# Patient Record
Sex: FEMALE | Race: WHITE | NOT HISPANIC OR LATINO | Employment: OTHER | URBAN - METROPOLITAN AREA
[De-identification: names, ages, dates, MRNs, and addresses within clinical notes are randomized per-mention and may not be internally consistent; named-entity substitution may affect disease eponyms.]

---

## 2024-06-21 ENCOUNTER — TRANSFERRED RECORDS (OUTPATIENT)
Dept: HEALTH INFORMATION MANAGEMENT | Facility: CLINIC | Age: 79
End: 2024-06-21
Payer: COMMERCIAL

## 2024-06-25 ENCOUNTER — TRANSCRIBE ORDERS (OUTPATIENT)
Dept: OTHER | Age: 79
End: 2024-06-25

## 2024-06-25 DIAGNOSIS — C54.1 PRIMARY CLEAR CELL ADENOCARCINOMA OF ENDOMETRIUM (H): Primary | ICD-10-CM

## 2024-07-02 ENCOUNTER — TELEPHONE (OUTPATIENT)
Dept: RADIATION ONCOLOGY | Facility: CLINIC | Age: 79
End: 2024-07-02
Payer: COMMERCIAL

## 2024-07-02 NOTE — TELEPHONE ENCOUNTER
RN called pt and reviewed up coming appointments. [facility] and gave pt number to call and set up a My Chart.  All questions were answered at this time. Clinic phone number given to pt as well.

## 2024-07-05 ENCOUNTER — TELEPHONE (OUTPATIENT)
Dept: RADIATION ONCOLOGY | Facility: CLINIC | Age: 79
End: 2024-07-05
Payer: COMMERCIAL

## 2024-07-05 NOTE — TELEPHONE ENCOUNTER
Call from different team member in [place] saying they spoke with Pt this am and she needed contact with the [facility].     Called patient and left VM with nursing desk number so we could help connect pt to [facility].

## 2024-08-04 NOTE — PROGRESS NOTES
ATTENDING NOTE:    DIAGNOSIS: Clear cell carcinoma of the endometrium, FIGO IA (), FIGO IC ()    HPI: Ms. Macdonald is a 80 yo female with a newly diagnosed early stage endometrial cancer. This was incidentally found on CT scan ordered for evaluation of recurrent UTI. On the imaging, her uterus was noted to be heterogeneous with several masses and distended cavity. Hysteroscopy and D&C on  showed polypoid tissue and calcification in the left fundal superior sidewall. 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 TP53 wildtype. She then established care with Dr. Bravo and proceeded with TLH-BSO-SLN sampling on 2024. Pelvic washing was negative for malignant cells. 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 However, adenomyosis was present and involved by carcinoma. LVSI was not identified. A total of 2 SLN were removed, with ITC identified in a right pelvic node. Final pathologic stage pT1aN0(I+), FIGO IA (), FIGO IC ().    She saw Dr. Francisco in heme/onc on 2024 and was recommended 3 cycles of Carbo/Taxol. She met with Dr. Marcos on 2024,who referred her to us for vaginal brachytherapy.     On interview, she states that she has recovered well from the surgery. She denies vaginal bleeding or discharge. She tolerated the first cycle of Carbo/Taxol well without noticing any side effects, though her counts are a bit low necessitating delay vs. dose reduction in her next cycle of chemo. She has a history of recurrent UTI and is on prophylactic antibiotics.       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     ,  x 2,   x 1    PAST RADIATION HISTORY:  3000 cGy in 15 fractions to the right neck by Dr. Alvarado in [place]       EXAM: bimanual exam showed well smooth vaginal canal and cuff with no palpable nodularity.       ASSESSMENT/PLAN: Ms. Macdonald is a 80 yo female with a clear cell carcinoma of the endometrium, s/p surgical staging. She is currently receiving adjuvant chemotherapy with Carbo/Taxol. We again reviewed her pathologic findings and explained the rationale of adjuvant brachytherapy, which is to reduce the risk of cuff recurrence. She does have ITC identified in a SLN. We explained that ITC by itself does not influence treatment decision. She has no additional intrauterine risk factors to warrant pelvic massimo irradiation.     We then discussed the logistics and the side effects of the vaginal cuff brachytherapy in detail. We recommend 3000 cGy given over 5 fractions every other day. She is scheduled to proceed her first treatment today. She will be staying at [facility] for the duration of the treatment.       Thanks for allowing us to participate in the care of this ryne lady.    Vidal Hampton MD  [phone]  Radiation Oncology        I reviewed patient's chart, internal/external medical records, imaging studies (including actual images), labs and pathology reports.  I interviewed and counseled the patient face to face.  I additionally ordered tests and discussed the case with patient's referring physicians and care team.          Vidal Hampton MD

## 2024-08-06 NOTE — PROGRESS NOTES
Department of Radiation Oncology                   [place] [address]  [address] [place]  [address]  Office:  [phone]  Fax:  [phone]   Radiation Oncology Clinic  [address] [place] [address]  Phone:  [phone]  Fax:  [phone]     RE: Sophy Macdonald : 1945   MRN: [id number] DIANDRA: 2024     OUTPATIENT VISIT NOTE       PROBLEM: Clear cell carcinoma of the endometrium, FIGO IA (), FIGO IC ()       was seen for initial consultation in the Dept of Radiation Oncology on 2024 at the request of Dr. Marcos    HISTORY OF PRESENT ILLNESS:     Ms. Macdonald is a 78 yo female with a newly diagnosed early stage endometrial cancer.     4/10/2024 - Patient had CT AP for recurrent UTI with concern for bladder or kidney stones. No calculi were identified. However the uterus was noted to be heterogenous with several uterine masses. There was also a density enlarging collection along the ventral uterus thought to represent degeneration of a fibroid vs fluid within the endometrial canal. Pelvic ultrasound recommended.    2024 - Uterus noted to be enlarged to 9.6 x 6.8 x 7.7 cm on pelvic ultrasound. Endometrium fluid-filled suggestive of benign debris extending to the internal cervical os, suggesting cervical stenosis as well as polyps and fibroids.    2024 - Patient presented to OB/GYN. Underwent endometrial biopsy which was unremarkable. Sampling was insufficient, recommendation to proceed with hysteroscopy and D&C.    5/10/2024 - Patient underwent hysteroscopy and D&C. Upon hysteroscopy, there was an area of polypoid tissue and calcification in the left fundal superior sidewall. Final pathology demonstrated high-grade endometrial carcinoma, favoring clear-cell type. P16 staining is also patchy positive, while progesterone receptor staining is negative.  P53 staining is wild-type.    2024 - Patient sees Dr. Bravo of Gyn Onc who recommended  robotic staging procedure.    6/4/2024 - Patient undergoes TLH with BSO and SLN sampling with removal of 2 SLN. Pathology again demonstrated clear cell adenocarcinoma, with no evidence of myometrial invasion. Adenomyosis was present, involved by carcinoma. No uterine serosal involvement, lower uterine segment involvement, or cervical stromal involvement identified. Peritoneal fluid was negative for malignant cells. LVSI not identified. All margines were negative for invasive carcinoma. Isolated tumor cells noted in right pelvic lymph node. Final staging was pT1a p N0(i+), FIGO stage IC (2023)     6/14/2024 - Patient follows with Dr. Jay Francisco of Heme/Onc for her history of diffuse large cell lymphoma and follicular lymphoma. She met with Dr. Francisco who recommended short course of adjuvant chemotherapy with 3 cycles of carboplatin and paclitaxel.    6/21/2024 - Patient met with Dr. Marcos of Radiation Oncology, who recommended vaginal cuff brachytherapy. As they do not offer this service in [place], she was referred to Dr. Hampton. At this point, patient was also recently diagnosed with her 7th UTI within the last year, and was taking antibiotics.     8/7/2024 - Patient presents to clinic today accompanied by her daughter Carmencita to discuss the role of radiation in her care. Today she is doing well, and recovering appropriately from surgery. She is not experiencing vaginal bleeding or discharge. She has had once cycle of chemotherapy, but the second was postponed due to low blood counts. Otherwise she is not symptomatic from chemotherapy. She is currently taking a prophylactic antibiotic, methenamine hippurate, for her recurrent urinary symptoms. She forgot the medication at home and requests our office to fill the prescription for a few doses.       PAST MEDICAL HISTORY:    Past Medical History:   Diagnosis Date    Anxiety     Chronic kidney disease, stage III (moderate) (H)     Depression     Endometrial  cancer (H)     clear-cell carcinoma of the uterus    Essential tremor     History of diffuse large B-cell lymphoma     treated with chemotherapy and adjuvant radiotherapy    History of follicular lymphoma     treated with rituximab    HTN (hypertension)     Hyperlipidemia     Osteoarthritis     Osteoporosis     Waldenstrom's disease (H)       CHEMOTHERAPY HISTORY:    4/19/2019 - completed 6 cycles of R-EPOCH    PAST RADIATION THERAPY HISTORY:   July 31, 2020, Ms. Macdonald completed 30 Gray in 15 fractions directed to the right neck under the care of of Dr. Jayson Alvarado in [place]      MEDICATIONS:   Current Outpatient Medications   Medication Sig Dispense Refill    acetaminophen (TYLENOL) 500 MG tablet Take 1,000 mg by mouth      ascorbic acid 1000 MG TABS tablet Take 2,000 mg by mouth      Biotin 10 MG TABS tablet Take 1 tablet by mouth daily      budesonide (PULMICORT FLEXHALER) 180 MCG/ACT inhaler Inhale 1 puff into the lungs      docusate sodium (DSS) 100 MG capsule Take 1 capsule by mouth 2 times daily      famotidine (PEPCID) 20 MG tablet Take 1 tablet by mouth 2 times daily      furosemide (LASIX) 20 MG tablet Take 1 tablet by mouth daily      levocetirizine (XYZAL) 5 MG tablet Take 1 tablet by mouth daily      methenamine hippurate (HIPREX) 1 g tablet Take 1 tablet by mouth 2 times daily (with meals)      metroNIDAZOLE (METROGEL) 1 % external gel Apply 0.5 g topically      ondansetron (ZOFRAN ODT) 4 MG ODT tab Take 1 tablet by mouth every 8 hours as needed      prochlorperazine (COMPAZINE) 10 MG tablet Take 10 mg by mouth      propranolol ER (INDERAL LA) 80 MG 24 hr capsule Take 80 mg by mouth      sertraline (ZOLOFT) 25 MG tablet Take 1 tablet by mouth daily      traZODone (DESYREL) 50 MG tablet Take 25-50 mg by mouth         ALLERGIES: is allergic to doxycycline, amoxicillin, azithromycin, and morphine.    SOCIAL HISTORY:      Ms. Macdonald is single and has 3 grown children. One of her daughters lives  in [place], close to the [facility]. She will be staying at the [facility] during her treatment. She is a non-smoker and does not drink alcohol.   Social History     Socioeconomic History    Marital status: Single     Spouse name: Not on file    Number of children: 3    Years of education: Not on file    Highest education level: Not on file   Occupational History    Not on file   Tobacco Use    Smoking status: Never    Smokeless tobacco: Never   Vaping Use    Vaping status: Former   Substance and Sexual Activity    Alcohol use: Not Currently    Drug use: Never    Sexual activity: Not on file   Other Topics Concern    Not on file   Social History Narrative    Not on file     Social Determinants of Health     Financial Resource Strain: Low Risk  (5/10/2024)    [facility]    Overall Financial Resource Strain (CARDIA)     Difficulty of Paying Living Expenses: Not hard at all   Food Insecurity: No Food Insecurity (6/4/2024)    [facility]    Hunger Vital Sign     Worried About Running Out of Food in the Last Year: Never true     Ran Out of Food in the Last Year: Never true   Transportation Needs: No Transportation Needs (6/4/2024)    [facility]    PRAPARE - Transportation     Lack of Transportation (Medical): No     Lack of Transportation (Non-Medical): No   Physical Activity: Inactive (5/10/2024)    [facility]    Exercise Vital Sign     Days of Exercise per Week: 0 days     Minutes of Exercise per Session: 0 min   Stress: Patient Declined (5/10/2024)    [facility]    [place] [place] [employer] - Occupational Stress Questionnaire     Feeling of Stress : Patient declined   Social Connections: Patient Declined (5/10/2024)    Received from  [facility]    Social Connection and Isolation Panel [NHANES]     Frequency of Communication with Friends and Family: Patient declined     Frequency of Social Gatherings with Friends and Family: Patient declined     Attends Yazidism Services: Patient declined     Active Member of Clubs or Organizations: Patient declined     Attends Club or Organization Meetings: Patient declined     Marital Status: Patient declined   Interpersonal Safety: Not At Risk (6/4/2024)    Received from [facility]     Do you feel UNSAFE in any of your personal relationships with your family members or any other acquaintances?: No   Housing Stability: Low Risk  (6/4/2024)    Received from [facility]    Housing Stability Vital Sign     Unable to Pay for Housing in the Last Year: No     Number of Times Moved in the Last Year: 0     Homeless in the Last Year: No        FAMILY HISTORY:   family history includes Hodgkin's lymphoma in her sister; Pancreatic Cancer in her mother.  Sister with Hodgkin's lymphoma who unfortunately passed at the age of 30   Maternal uncle with lymphoma, unsure of type, diagnosed in 60s    REVIEW OF SYMPTOMS:  A full 14-point review of systems was performed. See HPI for details.     PHYSICAL EXAMINATION:    /41   Pulse 61   SpO2 95%   There is no height or weight on file to calculate BMI.   Gen: alert and oriented, no acute distress  HEENT: unremarkable   CV: well perfused  Resp: breathing comfortably on room air  Neuro: grossly intact  Pelvic: deferred    PATHOLOGY:  5/14/2024 - Endometrial curettings  Endometrium, curettage:  -High-grade endometrial carcinoma, favor clear-cell type.  Tumor morphology suggests clear-cell carcinoma. However, sampling is relatively scant and definitive subtyping will be performed on the resection specimen.   Sections from the endometrial curettage  contain fragments of endometrium involved by a malignant glandular neoplasm.  The neoplasm has a varied glandular, microcystic and papillary architecture.  Many of the malignant cells exhibit water clear cytoplasm, with distinct cytoplasmic borders.  The tumor nuclei are round to oval and vesicular, with most containing 1 or 2 prominent eosinophilic nucleoli.  The tumor is infiltrating a desmoplastic stroma, with areas of neutrophilic inflammatory cells.  A battery of appropriately controlled immunohistochemical stains is applied to the neoplasm and shows it staining positive for vimentin, PAX8, Napsin (patchy), racemase (focal) and estrogen receptor (patchy).  P16 staining is also patchy positive, while progesterone receptor staining is negative.  P53 staining is wild-type.     Cytochemical, immunohistochemical, and/or in-situ hybridization stains are performed with either appropriate internal or external controls.    5/1/2024 - Endometrial biopsy:  - Strips of cytologically benign endometrium with benign tubal, eosinophilic, and secretory metaplasia.  - Rare inactive fragments of functionalis with stromal breakdown, negative for endometritis, hyperplasia, and malignancy.    6/4/2024 TLH, BSO, SLN sampling  A.  Omentum, omentectomy:  No evidence of malignancy     B.  [place] lymph node, right pelvic, excision:  One (1) lymph node with a single minute focus of isolated tumor cells (see comment)     C.  [place] lymph nodes, left pelvic, excision:  One (1) lymph node, negative for metastatic carcinoma     D.  Uterus with fallopian tubes and ovaries, hysterectomy and bilateral salpingo-oophorectomy:  Minute foci of residual endometrial carcinoma, involving adenomyosis (see synoptic report and comment)  Atrophic endometrium with features consistent with recent curettage procedure  Benign cervical polyp  Leiomyomata (including 1 lipoleiomyoma)    TUMOR   Histologic Type Clear cell adenocarcinoma, NOS   Myometrial  Invasion Not identified   Adenomyosis Present, involved by carcinoma   Uterine Serosa Involvement Not identified   Lower Uterine Segment Involvement Not identified   Cervical Stromal Involvement Not identified   Other Tissue / Organ Involvement Not identified   Peritoneal / Ascitic Fluid Negative for malignant cells   Lymphatic and / or Vascular Invasion Not identified   MARGINS   Margin Status All margins negative for invasive carcinoma   REGIONAL LYMPH NODES   Regional Lymph Node Status Tumor present in pelvic lymph node(s)     Total Number of Pelvic Nodes with Macrometastasis 0   Number of Pelvic [place] Nodes with Macrometastasis 0   Total Number of Pelvic Nodes with Micrometastasis 0   Number of Pelvic [place] Nodes with Micrometastasis 0   Total Number of Pelvic Nodes with Isolated Tumor Cells 1   Number of Pelvic [place] Nodes with ITCs 1   Laterality of Pelvic Node(s) with Tumor Right sentinel   Lymph Nodes Examined   Total Number of Pelvic Nodes Examined 2   Number of Pelvic [place] Nodes Examined 2   Total Number of Para-aortic Nodes Examined 0   pTNM CLASSIFICATION (AJCC 8th Edition)   Reporting of pT, pN, and (when applicable) pM categories is based on information available to the pathologist at the time the report is issued. As per the AJCC (Chapter 1, 8th Ed.) it is the managing physician s responsibility to establish the final pathologic stage based upon all pertinent information, including but potentially not limited to this pathology report.   pT Category pT1a   pN Category pN0(i+)   N Suffix (sn)   FIGO STAGE   FIGO Stage IC   ADDITIONAL FINDINGS   Additional Findings Leiomyomata, benign cervical polyp   Comments   The prior curettage specimen ([id number]) was reviewed. The high-grade, clear cell component seen in the curettage specimen is not identified in this resection specimen. Focal lower grade carcinoma is present, and appears to involve adenomyosis. There is no definitive evidence of  myometrial invasion. It is possible that the high-grade carcinoma seen in the previous curettage specimen was present within an endometrial polyp that was removed by the curettage procedure.       IMAGIN/10/2024 - CT AP  IMPRESSION:   1. Possible submillimeter calculus in the left renal calyx. No findings of urinary obstruction.   2.  Hydrometra raising the possibility for cervical stenosis. Differential diagnosis could include degenerating uterine fibroid. This could be further assessed with pelvic ultrasound.   3.  Cholelithiasis and esophageal hiatus hernia are incidental.       2024 - Transvaginal Ultrasound  FINDINGS:   The uterus is enlarged in size measuring 9.6 cm x 6.8 cm x 7.7 cm. The endometrial lining is difficult to assess secondary to hydrometra. Fluid-filled endometrium with fluid/fluid level, suggesting benign debris, measuring up to 95 mL/7.2 x 6.4 x 4.0 cm. Fluid extends to the cervical internal os, without passage. Otherwise the cervix is unremarkable. There are 2 subserosal fibroids visualized, measuring 5.6 x 5.4 x 4.2 cm within the right and mid uterus and 2.4 x 2.2 x 1.7 cm within the left mid uterus. There are 2 polyps which measure approximately 1.5 and 0.9 cm in greatest dimension near the uterine fundus.   Ovaries were not well visualized.   IMPRESSION:   1. Fluid-debris level hydrometra. This extends to the internal os of the cervix, suggesting cervical stenosis.   2. There are a couple of uterine endometrial polyps near the fundus.   3. Fibroid uterus.       ASSESSMENT AND PLAN: In summary, Sophy Macdonald is a 79 year old woman with newly diagnosed clear cell carcinoma of the endometrium, FIGO IA (), FIGO IC ().    The indications, risk, benefits, and side effects of radiation were discussed with the patient in great detail. Overall, radiation therapy would be recommended in this setting in order to improve local control. We reviewed with the patient what a typical course  of radiation would entail. The process of a CT simulation and treatment planning was explained. Images acquired during this session would be used for radiation treatment planning purposes.    The patient was made aware that treatment extends over a several-week time period and is only delivered on weekdays. The areas that would be targeted would include the vaginal cuff. We will plan for 30 Gy in 5 fractions, with treatment occurring every other day, starting today. The acute side effects of therapies and potential late toxicities were reviewed in detail. The patient was made aware that during the course of radiation, acute side effects can include, but are not limited to fatigue, vaginal discharge, dryness, itching, bleeding, urinary/bladder irritation, and diarrhea. The supportive therapies available for this were reviewed. The typical timeline for the resolution of these effects was given. Potential long term side effects can include, but are not limited to fibrosis, telangiectasias, stenosis, shortening or narrowing of the vagina, and dyspareunia, and a small <1% risk of secondary malignancies.    After the discussion, the patient was given the opportunity to ask questions and they were answered to her apparent satisfaction. The patient was encouraged to contact me regarding any additional questions in the interim and my phone number was provided.    Thank you for allowing us to participate in this patient's care.  Please feel free to call with any questions or concerns.       Polina Munroe, MS4  Radiation Oncology

## 2024-08-07 ENCOUNTER — OFFICE VISIT (OUTPATIENT)
Dept: RADIATION ONCOLOGY | Facility: CLINIC | Age: 79
End: 2024-08-07
Attending: RADIOLOGY
Payer: MEDICARE

## 2024-08-07 ENCOUNTER — PRE VISIT (OUTPATIENT)
Dept: RADIATION ONCOLOGY | Facility: CLINIC | Age: 79
End: 2024-08-07
Payer: COMMERCIAL

## 2024-08-07 VITALS — SYSTOLIC BLOOD PRESSURE: 131 MMHG | DIASTOLIC BLOOD PRESSURE: 41 MMHG | HEART RATE: 61 BPM | OXYGEN SATURATION: 95 %

## 2024-08-07 DIAGNOSIS — N39.0 CHRONIC UTI: Primary | ICD-10-CM

## 2024-08-07 DIAGNOSIS — C54.1 ENDOMETRIAL CANCER (H): Primary | ICD-10-CM

## 2024-08-07 PROCEDURE — 77332 RADIATION TREATMENT AID(S): CPT | Mod: 26 | Performed by: RADIOLOGY

## 2024-08-07 PROCEDURE — 271N000005 HC IMPLANT RADIATION BRIEF: Performed by: RADIOLOGY

## 2024-08-07 PROCEDURE — 77470 SPECIAL RADIATION TREATMENT: CPT | Performed by: RADIOLOGY

## 2024-08-07 PROCEDURE — 99204 OFFICE O/P NEW MOD 45 MIN: CPT | Mod: 25 | Performed by: RADIOLOGY

## 2024-08-07 PROCEDURE — 57156 INS VAG BRACHYTX DEVICE: CPT | Performed by: RADIOLOGY

## 2024-08-07 PROCEDURE — C1717 BRACHYTX, NON-STR,HDR IR-192: HCPCS | Performed by: RADIOLOGY

## 2024-08-07 PROCEDURE — 77470 SPECIAL RADIATION TREATMENT: CPT | Mod: 26 | Performed by: RADIOLOGY

## 2024-08-07 PROCEDURE — 77332 RADIATION TREATMENT AID(S): CPT | Performed by: RADIOLOGY

## 2024-08-07 PROCEDURE — 77263 THER RADIOLOGY TX PLNG CPLX: CPT | Performed by: RADIOLOGY

## 2024-08-07 PROCEDURE — 77290 THER RAD SIMULAJ FIELD CPLX: CPT | Mod: 26 | Performed by: RADIOLOGY

## 2024-08-07 PROCEDURE — 77295 3-D RADIOTHERAPY PLAN: CPT | Mod: 26 | Performed by: RADIOLOGY

## 2024-08-07 PROCEDURE — 77295 3-D RADIOTHERAPY PLAN: CPT | Performed by: RADIOLOGY

## 2024-08-07 PROCEDURE — G0463 HOSPITAL OUTPT CLINIC VISIT: HCPCS | Mod: 25 | Performed by: RADIOLOGY

## 2024-08-07 RX ORDER — METHENAMINE HIPPURATE 1000 MG/1
1 TABLET ORAL 2 TIMES DAILY WITH MEALS
COMMUNITY
Start: 2024-06-25

## 2024-08-07 RX ORDER — PROCHLORPERAZINE MALEATE 10 MG
10 TABLET ORAL
COMMUNITY
Start: 2024-07-05

## 2024-08-07 RX ORDER — FUROSEMIDE 20 MG
1 TABLET ORAL DAILY
COMMUNITY
Start: 2024-03-31

## 2024-08-07 RX ORDER — DOCUSATE SODIUM 100 MG/1
1 CAPSULE, LIQUID FILLED ORAL 2 TIMES DAILY
COMMUNITY
Start: 2024-06-04

## 2024-08-07 RX ORDER — FAMOTIDINE 20 MG/1
1 TABLET, FILM COATED ORAL 2 TIMES DAILY
COMMUNITY
Start: 2023-01-10

## 2024-08-07 RX ORDER — TRAZODONE HYDROCHLORIDE 50 MG/1
25-50 TABLET, FILM COATED ORAL
COMMUNITY
Start: 2024-02-12

## 2024-08-07 RX ORDER — METHENAMINE HIPPURATE 1000 MG/1
1 TABLET ORAL 2 TIMES DAILY
Qty: 6 TABLET | Refills: 0 | Status: SHIPPED | OUTPATIENT
Start: 2024-08-07

## 2024-08-07 RX ORDER — METRONIDAZOLE 10 MG/G
0.5 GEL TOPICAL
COMMUNITY
Start: 2023-04-26

## 2024-08-07 RX ORDER — BUDESONIDE 180 UG/1
1 AEROSOL, POWDER RESPIRATORY (INHALATION)
COMMUNITY
Start: 2024-07-31 | End: 2025-07-31

## 2024-08-07 RX ORDER — SERTRALINE HYDROCHLORIDE 25 MG/1
1 TABLET, FILM COATED ORAL DAILY
COMMUNITY
Start: 2023-12-31

## 2024-08-07 RX ORDER — LEVOCETIRIZINE DIHYDROCHLORIDE 5 MG/1
1 TABLET, FILM COATED ORAL DAILY
COMMUNITY
Start: 2023-12-31

## 2024-08-07 RX ORDER — ACETAMINOPHEN 500 MG
1000 TABLET ORAL
COMMUNITY
Start: 2024-06-04

## 2024-08-07 RX ORDER — BIOTIN 10 MG
1 TABLET ORAL DAILY
COMMUNITY
Start: 2023-09-05

## 2024-08-07 RX ORDER — PROPRANOLOL HYDROCHLORIDE 80 MG/1
80 CAPSULE, EXTENDED RELEASE ORAL
COMMUNITY
Start: 2024-03-31

## 2024-08-07 RX ORDER — ONDANSETRON 4 MG/1
1 TABLET, ORALLY DISINTEGRATING ORAL EVERY 8 HOURS PRN
COMMUNITY
Start: 2022-11-16

## 2024-08-07 NOTE — PROGRESS NOTES
Oncology Rooming Note    August 7, 2024 9:34 AM   Sopyh Macdonald is a 79 year old female who presents for:    Chief Complaint   Patient presents with    Cancer     Radiation consult     Initial Vitals: /41   Pulse 61   SpO2 95%  There is no height or weight on file to calculate BMI. There is no height or weight on file to calculate BSA.  No Pain (0) Comment: Data Unavailable   No LMP recorded.  Allergies reviewed: Yes  Medications reviewed: Yes    Medications: MEDICATION REFILLS NEEDED TODAY. Provider was notified.  Pharmacy name entered into EPIC: Pt needs Hiprex 1 GM 2 times daily with meals. Pt forgot her pills at home and needs them while staying at [facility]    Frailty Screening:   Is the patient here for a new oncology consult visit in cancer care? 2. No      Clinical concerns:   Here for consultation  was notified.    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 Jayson Alvarado here in [place]    Prior hormonal therapy: Vaginal cream short time just before her hysterectomy.    Pain Eval:  Denies    Psychosocial  Living arrangements: Self  Fall Risk: ambulates with assistive device, walker   referral needs: Not needed    Advanced Directive: Yes - Location: At home  Implantable Cardiac Device? No    Onset of menarche: @ age 12  LMP: No LMP recorded.  Onset of menopause: @ age 50  Abnormal vaginal bleeding/discharge: No  Are you pregnant? No  Reproductive note: 3 children    Nurse face-to-face time: Level 4:  15 min face to face timeConsiderations for radiation treatment   Pregnancy status: Female with hysterectomy   Implanted Cardiac Devices: No   Any previous radiation therapy: Yes

## 2024-08-07 NOTE — LETTER
8/7/2024      Sophy Macdonald  [address]  [facility] [address]      Dear Colleague,    Thank you for referring your patient, Sophy Macdonald, to the [facility] RADIATION ONCOLOGY. Please see a copy of my visit note below.    ATTENDING NOTE:    DIAGNOSIS: Clear cell carcinoma of the endometrium, FIGO IA (2009), FIGO IC (2023)    HPI: Ms. Macdonald is a 80 yo female with a newly diagnosed early stage endometrial cancer. This was incidentally found on CT scan ordered for evaluation of recurrent UTI. On the imaging, her uterus was noted to be heterogeneous with several masses and distended cavity. Hysteroscopy and D&C on 5/14 showed polypoid tissue and calcification in the left fundal superior sidewall. 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 TP53 wildtype. She then established care with Dr. Bravo and proceeded with TLH-BSO-SLN sampling on 6/4/2024. Pelvic washing was negative for malignant cells. 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 However, adenomyosis was present and involved by carcinoma. LVSI was not identified. A total of 2 SLN were removed, with ITC identified in a right pelvic node. Final pathologic stage pT1aN0(I+), FIGO IA (2009), FIGO IC (2023).    She saw Dr. Francisco in heme/onc on 6/14/2024 and was recommended 3 cycles of Carbo/Taxol. She met with Dr. Marcos on 6/21/2024,who referred her to us for vaginal brachytherapy.     On interview, she states that she has recovered well from the surgery. She denies vaginal bleeding or discharge. She tolerated the first cycle of Carbo/Taxol well without noticing any side effects, though her counts are a bit low necessitating delay vs. dose reduction in her next cycle of chemo. She has a history of recurrent UTI and is on prophylactic antibiotics.       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     ,  x 2,  x 1    PAST RADIATION HISTORY:  3000 cGy in 15 fractions to the right neck by Dr. Alvarado in [place]       EXAM: bimanual exam showed well smooth vaginal canal and cuff with no palpable nodularity.       ASSESSMENT/PLAN: Ms. Macdonald is a 80 yo female with a clear cell carcinoma of the endometrium, s/p surgical staging. She is currently receiving adjuvant chemotherapy with Carbo/Taxol. We again reviewed her pathologic findings and explained the rationale of adjuvant brachytherapy, which is to reduce the risk of cuff recurrence. She does have ITC identified in a SLN. We explained that ITC by itself does not influence treatment decision. She has no additional intrauterine risk factors to warrant pelvic massimo irradiation.     We then discussed the logistics and the side effects of the vaginal cuff brachytherapy in detail. We recommend 3000 cGy given over 5 fractions every other day. She is scheduled to proceed her first treatment today. She will be staying at [facility] for the duration of the treatment.       Thanks for allowing us to participate in the care of this ryne lady.    Vidal Hampton MD  [phone]  Radiation Oncology        I reviewed patient's chart, internal/external medical records, imaging studies (including actual images), labs and pathology reports.  I interviewed and counseled the patient face to face.  I additionally ordered tests and discussed the case with patient's referring physicians and care team.          Vidal Hampton MD              Department of Radiation Oncology                   [place] [address]  [address] [place] [address]  [id number]  Office:  [phone]  Fax:  [phone]   Radiation Oncology Clinic  [facility] [place] [address]  Phone:  [phone]  Fax:  [phone]     RE: Sophy Macdonald : 1945   MRN: [id number] DIANDRA: 2024     OUTPATIENT VISIT NOTE       PROBLEM: Clear cell carcinoma of the endometrium, FIGO IA (), FIGO IC ()       was seen for initial consultation in the Dept of Radiation Oncology on 2024 at the request of Dr. Marcos    HISTORY OF PRESENT ILLNESS:     Ms. Macdonald is a 80 yo female with a newly diagnosed early stage endometrial cancer.     4/10/2024 - Patient had CT AP for recurrent UTI with concern for bladder or kidney stones. No calculi were identified. However the uterus was noted to be heterogenous with several uterine masses. There was also a density enlarging collection along the ventral uterus thought to represent degeneration of a fibroid vs fluid within the endometrial canal. Pelvic ultrasound recommended.    2024 - Uterus noted to be enlarged to 9.6 x 6.8 x 7.7 cm on pelvic ultrasound. Endometrium fluid-filled suggestive of benign debris extending to the internal cervical os, suggesting cervical stenosis as well as polyps and fibroids.    2024 - Patient presented to OB/GYN. Underwent endometrial biopsy which was unremarkable. Sampling was insufficient, recommendation to proceed with hysteroscopy and D&C.    5/10/2024 - Patient underwent hysteroscopy and D&C. Upon hysteroscopy, there was an area of polypoid tissue and calcification in the left fundal superior sidewall. Final pathology demonstrated high-grade endometrial carcinoma, favoring clear-cell type. P16 staining is also patchy positive, while progesterone receptor staining is negative.  P53 staining is wild-type.    2024 - Patient sees Dr. Bravo of Gyn Onc who recommended robotic staging procedure.    2024 - Patient undergoes TLH with BSO and SLN sampling with removal of 2 SLN. Pathology again demonstrated clear cell adenocarcinoma, with no evidence of myometrial  invasion. Adenomyosis was present, involved by carcinoma. No uterine serosal involvement, lower uterine segment involvement, or cervical stromal involvement identified. Peritoneal fluid was negative for malignant cells. LVSI not identified. All margines were negative for invasive carcinoma. Isolated tumor cells noted in right pelvic lymph node. Final staging was pT1a p N0(i+), FIGO stage IC (2023)     6/14/2024 - Patient follows with Dr. Jay Francisco of Heme/Onc for her history of diffuse large cell lymphoma and follicular lymphoma. She met with Dr. Francisco who recommended short course of adjuvant chemotherapy with 3 cycles of carboplatin and paclitaxel.    6/21/2024 - Patient met with Dr. Marcos of Radiation Oncology, who recommended vaginal cuff brachytherapy. As they do not offer this service in [place], she was referred to Dr. Hampton. At this point, patient was also recently diagnosed with her 7th UTI within the last year, and was taking antibiotics.     8/7/2024 - Patient presents to clinic today accompanied by her daughter Carmencita to discuss the role of radiation in her care. Today she is doing well, and recovering appropriately from surgery. She is not experiencing vaginal bleeding or discharge. She has had once cycle of chemotherapy, but the second was postponed due to low blood counts. Otherwise she is not symptomatic from chemotherapy. She is currently taking a prophylactic antibiotic, methenamine hippurate, for her recurrent urinary symptoms. She forgot the medication at home and requests our office to fill the prescription for a few doses.       PAST MEDICAL HISTORY:    Past Medical History:   Diagnosis Date     Anxiety      Chronic kidney disease, stage III (moderate) (H)      Depression      Endometrial cancer (H)     clear-cell carcinoma of the uterus     Essential tremor      History of diffuse large B-cell lymphoma     treated with chemotherapy and adjuvant radiotherapy     History of follicular  lymphoma     treated with rituximab     HTN (hypertension)      Hyperlipidemia      Osteoarthritis      Osteoporosis      Waldenstrom's disease (H)       CHEMOTHERAPY HISTORY:    4/19/2019 - completed 6 cycles of R-EPOCH    PAST RADIATION THERAPY HISTORY:   July 31, 2020, Ms. Macdonald completed 30 Gray in 15 fractions directed to the right neck under the care of of Dr. Jayson Alvarado in [place]      MEDICATIONS:   Current Outpatient Medications   Medication Sig Dispense Refill     acetaminophen (TYLENOL) 500 MG tablet Take 1,000 mg by mouth       ascorbic acid 1000 MG TABS tablet Take 2,000 mg by mouth       Biotin 10 MG TABS tablet Take 1 tablet by mouth daily       budesonide (PULMICORT FLEXHALER) 180 MCG/ACT inhaler Inhale 1 puff into the lungs       docusate sodium (DSS) 100 MG capsule Take 1 capsule by mouth 2 times daily       famotidine (PEPCID) 20 MG tablet Take 1 tablet by mouth 2 times daily       furosemide (LASIX) 20 MG tablet Take 1 tablet by mouth daily       levocetirizine (XYZAL) 5 MG tablet Take 1 tablet by mouth daily       methenamine hippurate (HIPREX) 1 g tablet Take 1 tablet by mouth 2 times daily (with meals)       metroNIDAZOLE (METROGEL) 1 % external gel Apply 0.5 g topically       ondansetron (ZOFRAN ODT) 4 MG ODT tab Take 1 tablet by mouth every 8 hours as needed       prochlorperazine (COMPAZINE) 10 MG tablet Take 10 mg by mouth       propranolol ER (INDERAL LA) 80 MG 24 hr capsule Take 80 mg by mouth       sertraline (ZOLOFT) 25 MG tablet Take 1 tablet by mouth daily       traZODone (DESYREL) 50 MG tablet Take 25-50 mg by mouth         ALLERGIES: is allergic to doxycycline, amoxicillin, azithromycin, and morphine.    SOCIAL HISTORY:      Ms. Macdonald is single and has 3 grown children. One of her daughters lives in [place], close to the [facility]. She will be staying at the [facility] during her treatment. She is a non-smoker and does not drink alcohol.   Social  History     Socioeconomic History     Marital status: Single     Spouse name: Not on file     Number of children: 3     Years of education: Not on file     Highest education level: Not on file   Occupational History     Not on file   Tobacco Use     Smoking status: Never     Smokeless tobacco: Never   Vaping Use     Vaping status: Former   Substance and Sexual Activity     Alcohol use: Not Currently     Drug use: Never     Sexual activity: Not on file   Other Topics Concern     Not on file   Social History Narrative     Not on file     Social Determinants of Health     Financial Resource Strain: Low Risk  (5/10/2024)    Received from [employer] [facility]    Overall Financial Resource Strain (CARDIA)      Difficulty of Paying Living Expenses: Not hard at all   Food Insecurity: No Food Insecurity (6/4/2024)    [facility] [employer] [facility] [employer] [facility]    Hunger Vital Sign      Worried About Running Out of Food in the Last Year: Never true      Ran Out of Food in the Last Year: Never true   Transportation Needs: No Transportation Needs (6/4/2024)    [facility] [employer] [facility] [employer] [facility]    PRAPARE - Transportation      Lack of Transportation (Medical): No      Lack of Transportation (Non-Medical): No   Physical Activity: Inactive (5/10/2024)    [facility] [employer] [facility] [employer] [facility]    Exercise Vital Sign      Days of Exercise per Week: 0 days      Minutes of Exercise per Session: 0 min   Stress: Patient Declined (5/10/2024)    Received from [employer] [facility]    [employer] [place] [employer] - Occupational Stress Questionnaire      Feeling of Stress : Patient declined   Social Connections: Patient Declined (5/10/2024)    [facility] [employer] [facility] [employer] [facility]    Social Connection and Isolation Panel [NHANES]      Frequency of Communication with Friends and Family:  Patient declined      Frequency of Social Gatherings with Friends and Family: Patient declined      Attends Scientology Services: Patient declined      Active Member of Clubs or Organizations: Patient declined      Attends Club or Organization Meetings: Patient declined      Marital Status: Patient declined   Interpersonal Safety: Not At Risk (6/4/2024)    Received from [facility] [employer]      Do you feel UNSAFE in any of your personal relationships with your family members or any other acquaintances?: No   Housing Stability: Low Risk  (6/4/2024)    Received from [facility]    Housing Stability Vital Sign      Unable to Pay for Housing in the Last Year: No      Number of Times Moved in the Last Year: 0      Homeless in the Last Year: No        FAMILY HISTORY:   family history includes Hodgkin's lymphoma in her sister; Pancreatic Cancer in her mother.  Sister with Hodgkin's lymphoma who unfortunately passed at the age of 30   Maternal uncle with lymphoma, unsure of type, diagnosed in 60s    REVIEW OF SYMPTOMS:  A full 14-point review of systems was performed. See HPI for details.     PHYSICAL EXAMINATION:    /41   Pulse 61   SpO2 95%   There is no height or weight on file to calculate BMI.   Gen: alert and oriented, no acute distress  HEENT: unremarkable   CV: well perfused  Resp: breathing comfortably on room air  Neuro: grossly intact  Pelvic: deferred    PATHOLOGY:  5/14/2024 - Endometrial curettings  Endometrium, curettage:  -High-grade endometrial carcinoma, favor clear-cell type.  Tumor morphology suggests clear-cell carcinoma. However, sampling is relatively scant and definitive subtyping will be performed on the resection specimen.   Sections from the endometrial curettage contain fragments of endometrium involved by a malignant glandular neoplasm.  The neoplasm has a varied glandular, microcystic and papillary  architecture.  Many of the malignant cells exhibit water clear cytoplasm, with distinct cytoplasmic borders.  The tumor nuclei are round to oval and vesicular, with most containing 1 or 2 prominent eosinophilic nucleoli.  The tumor is infiltrating a desmoplastic stroma, with areas of neutrophilic inflammatory cells.  A battery of appropriately controlled immunohistochemical stains is applied to the neoplasm and shows it staining positive for vimentin, PAX8, Napsin (patchy), racemase (focal) and estrogen receptor (patchy).  P16 staining is also patchy positive, while progesterone receptor staining is negative.  P53 staining is wild-type.     Cytochemical, immunohistochemical, and/or in-situ hybridization stains are performed with either appropriate internal or external controls.    5/1/2024 - Endometrial biopsy:  - Strips of cytologically benign endometrium with benign tubal, eosinophilic, and secretory metaplasia.  - Rare inactive fragments of functionalis with stromal breakdown, negative for endometritis, hyperplasia, and malignancy.    6/4/2024 TLH, BSO, SLN sampling  A.  Omentum, omentectomy:  No evidence of malignancy     B.  [place] lymph node, right pelvic, excision:  One (1) lymph node with a single minute focus of isolated tumor cells (see comment)     C.  [place] lymph nodes, left pelvic, excision:  One (1) lymph node, negative for metastatic carcinoma     D.  Uterus with fallopian tubes and ovaries, hysterectomy and bilateral salpingo-oophorectomy:  Minute foci of residual endometrial carcinoma, involving adenomyosis (see synoptic report and comment)  Atrophic endometrium with features consistent with recent curettage procedure  Benign cervical polyp  Leiomyomata (including 1 lipoleiomyoma)    TUMOR   Histologic Type Clear cell adenocarcinoma, NOS   Myometrial Invasion Not identified   Adenomyosis Present, involved by carcinoma   Uterine Serosa Involvement Not identified   Lower Uterine Segment  Involvement Not identified   Cervical Stromal Involvement Not identified   Other Tissue / Organ Involvement Not identified   Peritoneal / Ascitic Fluid Negative for malignant cells   Lymphatic and / or Vascular Invasion Not identified   MARGINS   Margin Status All margins negative for invasive carcinoma   REGIONAL LYMPH NODES   Regional Lymph Node Status Tumor present in pelvic lymph node(s)     Total Number of Pelvic Nodes with Macrometastasis 0   Number of Pelvic [place] Nodes with Macrometastasis 0   Total Number of Pelvic Nodes with Micrometastasis 0   Number of Pelvic [place] Nodes with Micrometastasis 0   Total Number of Pelvic Nodes with Isolated Tumor Cells 1   Number of Pelvic [place] Nodes with ITCs 1   Laterality of Pelvic Node(s) with Tumor Right sentinel   Lymph Nodes Examined   Total Number of Pelvic Nodes Examined 2   Number of Pelvic [place] Nodes Examined 2   Total Number of Para-aortic Nodes Examined 0   pTNM CLASSIFICATION (AJCC 8th Edition)   Reporting of pT, pN, and (when applicable) pM categories is based on information available to the pathologist at the time the report is issued. As per the AJCC (Chapter 1, 8th Ed.) it is the managing physician s responsibility to establish the final pathologic stage based upon all pertinent information, including but potentially not limited to this pathology report.   pT Category pT1a   pN Category pN0(i+)   N Suffix (sn)   FIGO STAGE   FIGO Stage IC   ADDITIONAL FINDINGS   Additional Findings Leiomyomata, benign cervical polyp   Comments   The prior curettage specimen ([id number]) was reviewed. The high-grade, clear cell component seen in the curettage specimen is not identified in this resection specimen. Focal lower grade carcinoma is present, and appears to involve adenomyosis. There is no definitive evidence of myometrial invasion. It is possible that the high-grade carcinoma seen in the previous curettage specimen was present within an endometrial  polyp that was removed by the curettage procedure.       IMAGIN/10/2024 - CT AP  IMPRESSION:   1. Possible submillimeter calculus in the left renal calyx. No findings of urinary obstruction.   2.  Hydrometra raising the possibility for cervical stenosis. Differential diagnosis could include degenerating uterine fibroid. This could be further assessed with pelvic ultrasound.   3.  Cholelithiasis and esophageal hiatus hernia are incidental.       2024 - Transvaginal Ultrasound  FINDINGS:   The uterus is enlarged in size measuring 9.6 cm x 6.8 cm x 7.7 cm. The endometrial lining is difficult to assess secondary to hydrometra. Fluid-filled endometrium with fluid/fluid level, suggesting benign debris, measuring up to 95 mL/7.2 x 6.4 x 4.0 cm. Fluid extends to the cervical internal os, without passage. Otherwise the cervix is unremarkable. There are 2 subserosal fibroids visualized, measuring 5.6 x 5.4 x 4.2 cm within the right and mid uterus and 2.4 x 2.2 x 1.7 cm within the left mid uterus. There are 2 polyps which measure approximately 1.5 and 0.9 cm in greatest dimension near the uterine fundus.   Ovaries were not well visualized.   IMPRESSION:   1. Fluid-debris level hydrometra. This extends to the internal os of the cervix, suggesting cervical stenosis.   2. There are a couple of uterine endometrial polyps near the fundus.   3. Fibroid uterus.       ASSESSMENT AND PLAN: In summary, Sophy Macdonald is a 79 year old woman with newly diagnosed clear cell carcinoma of the endometrium, FIGO IA (), FIGO IC ().    The indications, risk, benefits, and side effects of radiation were discussed with the patient in great detail. Overall, radiation therapy would be recommended in this setting in order to improve local control. We reviewed with the patient what a typical course of radiation would entail. The process of a CT simulation and treatment planning was explained. Images acquired during this session would  be used for radiation treatment planning purposes.    The patient was made aware that treatment extends over a several-week time period and is only delivered on weekdays. The areas that would be targeted would include the vaginal cuff. We will plan for 30 Gy in 5 fractions, with treatment occurring every other day, starting today. The acute side effects of therapies and potential late toxicities were reviewed in detail. The patient was made aware that during the course of radiation, acute side effects can include, but are not limited to fatigue, vaginal discharge, dryness, itching, bleeding, urinary/bladder irritation, and diarrhea. The supportive therapies available for this were reviewed. The typical timeline for the resolution of these effects was given. Potential long term side effects can include, but are not limited to fibrosis, telangiectasias, stenosis, shortening or narrowing of the vagina, and dyspareunia, and a small <1% risk of secondary malignancies.    After the discussion, the patient was given the opportunity to ask questions and they were answered to her apparent satisfaction. The patient was encouraged to contact me regarding any additional questions in the interim and my phone number was provided.    Thank you for allowing us to participate in this patient's care.  Please feel free to call with any questions or concerns.       Polina Munroe, MS4  Radiation Oncology            Oncology Rooming Note    August 7, 2024 9:11 AM   Sophy Macdonald is a 79 year old female who presents for:    Chief Complaint   Patient presents with     Cancer     Radiation consult     Initial Vitals: There were no vitals taken for this visit. There is no height or weight on file to calculate BMI. There is no height or weight on file to calculate BSA.  Data Unavailable Comment: Data Unavailable   No LMP recorded.  Allergies reviewed: Yes  Medications reviewed: Yes    Medications: Medication refills not needed  today.  Pharmacy name entered into EPIC: Data Unavailable    Frailty Screening:   Is the patient here for a new oncology consult visit in cancer care? 2. No      Clinical concerns:   Here for consult  was notified.      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 Jayson Alvarado here in [place]            Oncology Rooming Note    August 7, 2024 9:34 AM   Sophy Macdonald is a 79 year old female who presents for:    Chief Complaint   Patient presents with     Cancer     Radiation consult     Initial Vitals: /41   Pulse 61   SpO2 95%  There is no height or weight on file to calculate BMI. There is no height or weight on file to calculate BSA.  No Pain (0) Comment: Data Unavailable   No LMP recorded.  Allergies reviewed: Yes  Medications reviewed: Yes    Medications: MEDICATION REFILLS NEEDED TODAY. Provider was notified.  Pharmacy name entered into EPIC: Pt needs Hiprex 1 GM 2 times daily with meals. Pt forgot her pills at home and needs them while staying at [facility]    Frailty Screening:   Is the patient here for a new oncology consult visit in cancer care? 2. No      Clinical concerns:   Here for consultation  was notified.    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 Jayson Alvarado here in [place]    Prior hormonal therapy: Vaginal cream short time just before her hysterectomy.    Pain Eval:  Denies    Psychosocial  Living arrangements: Self  Fall Risk: ambulates with assistive device, walker   referral  needs: Not needed    Advanced Directive: Yes - Location: At home  Implantable Cardiac Device? No    Onset of menarche: @ age 12  LMP: No LMP recorded.  Onset of menopause: @ age 50  Abnormal vaginal bleeding/discharge: No  Are you pregnant? No  Reproductive note: 3 children    Nurse face-to-face time: Level 4:  15 min face to face timeConsiderations for radiation treatment   Pregnancy status: Female with hysterectomy   Implanted Cardiac Devices: No   Any previous radiation therapy: Yes      Again, thank you for allowing me to participate in the care of your patient.        Sincerely,        Vidal Hampton MD

## 2024-08-07 NOTE — PROGRESS NOTES
Sophy Macdonald is here for scheduled HDR brachytherapy    HDR Single Channel Cylinder  1    Pre-procedure-  Patient identified, arm band applied, signed consent available   Medications taken by patient prior to procedure, none.  Pain assessment: 0/10  There were no vitals taken for this visit. Taken at consult earlier today.    Waiting-  Pt resting quietly, daughter by her side. Bed low and locked. Call light in reach.    Post-procedure-  Pain assessment: 0/10  Device removed without difficulty, Education for possible side effects and management, and Discharged to [facility] with her daughter.

## 2024-08-07 NOTE — PROGRESS NOTES
Oncology Rooming Note    August 7, 2024 9:11 AM   Sophy Macdonald is a 79 year old female who presents for:    Chief Complaint   Patient presents with    Cancer     Radiation consult     Initial Vitals: There were no vitals taken for this visit. There is no height or weight on file to calculate BMI. There is no height or weight on file to calculate BSA.  Data Unavailable Comment: Data Unavailable   No LMP recorded.  Allergies reviewed: Yes  Medications reviewed: Yes    Medications: Medication refills not needed today.  Pharmacy name entered into EPIC: Data Unavailable    Frailty Screening:   Is the patient here for a new oncology consult visit in cancer care? 2. No      Clinical concerns:   Here for consult  was notified.      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 Jayson Alvarado here in [place]

## 2024-08-07 NOTE — LETTER
8/7/2024      Sophy Macdonald  [address]  [facility] [address]      Dear Colleague,    Thank you for referring your patient, Sophy Macdonald, to the [facility] RADIATION ONCOLOGY. Please see a copy of my visit note below.    Sophy Macdonald is here for scheduled HDR brachytherapy    HDR Single Channel Cylinder  1    Pre-procedure-  Patient identified, arm band applied, signed consent available   Medications taken by patient prior to procedure, none.  Pain assessment: 0/10  There were no vitals taken for this visit. Taken at consult earlier today.    Waiting-  Pt resting quietly, daughter by her side. Bed low and locked. Call light in reach.    Post-procedure-  Pain assessment: 0/10  Device removed without difficulty, Education for possible side effects and management, and [facility]         Again, thank you for allowing me to participate in the care of your patient.        Sincerely,        Vidal Hampton MD

## 2024-08-09 ENCOUNTER — OFFICE VISIT (OUTPATIENT)
Dept: RADIATION ONCOLOGY | Facility: CLINIC | Age: 79
End: 2024-08-09
Attending: RADIOLOGY
Payer: MEDICARE

## 2024-08-09 VITALS — RESPIRATION RATE: 16 BRPM | OXYGEN SATURATION: 100 % | HEART RATE: 64 BPM

## 2024-08-09 DIAGNOSIS — C80.1 CLEAR CELL CARCINOMA (H): Primary | ICD-10-CM

## 2024-08-09 PROCEDURE — 57156 INS VAG BRACHYTX DEVICE: CPT | Performed by: RADIOLOGY

## 2024-08-09 PROCEDURE — C1717 BRACHYTX, NON-STR,HDR IR-192: HCPCS | Performed by: RADIOLOGY

## 2024-08-09 PROCEDURE — 77280 THER RAD SIMULAJ FIELD SMPL: CPT | Performed by: RADIOLOGY

## 2024-08-09 PROCEDURE — 77770 HDR RDNCL NTRSTL/ICAV BRCHTX: CPT | Mod: 26 | Performed by: RADIOLOGY

## 2024-08-09 PROCEDURE — 77280 THER RAD SIMULAJ FIELD SMPL: CPT | Mod: 26 | Performed by: RADIOLOGY

## 2024-08-09 PROCEDURE — 77770 HDR RDNCL NTRSTL/ICAV BRCHTX: CPT | Performed by: RADIOLOGY

## 2024-08-09 PROCEDURE — 271N000005 HC IMPLANT RADIATION BRIEF: Performed by: RADIOLOGY

## 2024-08-09 ASSESSMENT — PAIN SCALES - GENERAL: PAINLEVEL: NO PAIN (0)

## 2024-08-09 NOTE — PROGRESS NOTES
Sophy Macdonald is here for scheduled HDR brachytherapy    HDR Single Channel Cylinder  2    Pre-procedure-  Time out done by Haley Diaz RN and Dr. Hampton.   Patient identified, arm band applied, signed consent available   Medications taken by patient prior to procedure none  Pain assessment: 0/10  Pulse 64   Resp 16   SpO2 100%     Post-procedure-  Pain assessment: 0/10   Device removed without difficulty, Education for possible side effects and management, Discharge teaching reviewed, questions answered, Follow-up scheduled, and Discharged to home with daughter, pt ambulated out on own.

## 2024-08-09 NOTE — LETTER
8/9/2024      Sophy Macdonald  [address]  [facility] [address]      Dear Colleague,    Thank you for referring your patient, Sophy Macdonald, to the [facility]. Please see a copy of my visit note below.    Sophy Macdonald is here for scheduled HDR brachytherapy    HDR Single Channel Cylinder  2    Pre-procedure-  Time out done by Haley Diaz RN and Dr. Hampton.   Patient identified, arm band applied, signed consent available   Medications taken by patient prior to procedure none  Pain assessment: 0/10  Pulse 64   Resp 16   SpO2 100%     Post-procedure-  Pain assessment: 0/10   Device removed without difficulty, Education for possible side effects and management, Discharge teaching reviewed, questions answered, Follow-up scheduled, and Discharged to home with daughter, pt ambulated out on own.       Again, thank you for allowing me to participate in the care of your patient.        Sincerely,        Vidal Hampton MD

## 2024-08-12 ENCOUNTER — OFFICE VISIT (OUTPATIENT)
Dept: RADIATION ONCOLOGY | Facility: CLINIC | Age: 79
End: 2024-08-12
Attending: RADIOLOGY
Payer: MEDICARE

## 2024-08-12 VITALS — DIASTOLIC BLOOD PRESSURE: 74 MMHG | RESPIRATION RATE: 16 BRPM | SYSTOLIC BLOOD PRESSURE: 128 MMHG | HEART RATE: 68 BPM

## 2024-08-12 DIAGNOSIS — C80.1 CLEAR CELL CARCINOMA (H): Primary | ICD-10-CM

## 2024-08-12 PROCEDURE — 271N000005 HC IMPLANT RADIATION BRIEF: Performed by: RADIOLOGY

## 2024-08-12 PROCEDURE — 77280 THER RAD SIMULAJ FIELD SMPL: CPT | Mod: 26 | Performed by: RADIOLOGY

## 2024-08-12 PROCEDURE — C1717 BRACHYTX, NON-STR,HDR IR-192: HCPCS | Performed by: RADIOLOGY

## 2024-08-12 PROCEDURE — 57156 INS VAG BRACHYTX DEVICE: CPT | Performed by: RADIOLOGY

## 2024-08-12 PROCEDURE — 77770 HDR RDNCL NTRSTL/ICAV BRCHTX: CPT | Mod: 26 | Performed by: RADIOLOGY

## 2024-08-12 PROCEDURE — 77280 THER RAD SIMULAJ FIELD SMPL: CPT | Performed by: RADIOLOGY

## 2024-08-12 PROCEDURE — 77770 HDR RDNCL NTRSTL/ICAV BRCHTX: CPT | Performed by: RADIOLOGY

## 2024-08-12 NOTE — LETTER
8/12/2024      Sophy Macdonald  [address]  [facility] [address]      Dear Colleague,    Thank you for referring your patient, Sophy Macdonald, to the [facility] RADIATION ONCOLOGY. Please see a copy of my visit note below.    Sophy Macdonald is here for scheduled HDR brachytherapy HDR Single Channel Cylinder  3    Pre-procedure-  Time out done by Deb Ramirez RN and Dr Hampton.   Patient identified, arm band applied, signed consent available   Medications taken by patient prior to procedure None  Pain assessment: 0/10      Post-procedure-  Pain assessment: 0/10   Device removed without difficulty, Education for possible side effects and management, Discharge teaching reviewed, questions answered, and Discharged to home      Again, thank you for allowing me to participate in the care of your patient.        Sincerely,        Vidal Hampton MD

## 2024-08-12 NOTE — PROGRESS NOTES
Sophy Macdonald is here for scheduled HDR brachytherapy HDR Single Channel Cylinder  3    Pre-procedure-  Time out done by Deb Ramirez RN and Dr Hampton.   Patient identified, arm band applied, signed consent available   Medications taken by patient prior to procedure None  Pain assessment: 0/10      Post-procedure-  Pain assessment: 0/10   Device removed without difficulty, Education for possible side effects and management, Discharge teaching reviewed, questions answered, and Discharged to home

## 2024-08-14 ENCOUNTER — OFFICE VISIT (OUTPATIENT)
Dept: RADIATION ONCOLOGY | Facility: CLINIC | Age: 79
End: 2024-08-14
Attending: RADIOLOGY
Payer: MEDICARE

## 2024-08-14 VITALS — DIASTOLIC BLOOD PRESSURE: 44 MMHG | OXYGEN SATURATION: 95 % | HEART RATE: 82 BPM | SYSTOLIC BLOOD PRESSURE: 140 MMHG

## 2024-08-14 DIAGNOSIS — C80.1 CLEAR CELL CARCINOMA (H): Primary | ICD-10-CM

## 2024-08-14 PROCEDURE — C1717 BRACHYTX, NON-STR,HDR IR-192: HCPCS | Performed by: RADIOLOGY

## 2024-08-14 PROCEDURE — 77770 HDR RDNCL NTRSTL/ICAV BRCHTX: CPT | Performed by: RADIOLOGY

## 2024-08-14 PROCEDURE — 57156 INS VAG BRACHYTX DEVICE: CPT | Performed by: RADIOLOGY

## 2024-08-14 PROCEDURE — 77280 THER RAD SIMULAJ FIELD SMPL: CPT | Mod: 26 | Performed by: RADIOLOGY

## 2024-08-14 PROCEDURE — 77280 THER RAD SIMULAJ FIELD SMPL: CPT | Performed by: RADIOLOGY

## 2024-08-14 PROCEDURE — 271N000005 HC IMPLANT RADIATION BRIEF: Performed by: RADIOLOGY

## 2024-08-14 PROCEDURE — 77770 HDR RDNCL NTRSTL/ICAV BRCHTX: CPT | Mod: 26 | Performed by: RADIOLOGY

## 2024-08-14 NOTE — LETTER
8/14/2024      Sophy Macdonald  [address]  [place] [address]      Dear Colleague,    Thank you for referring your patient, Sophy Macdonald, to the [facility] RADIATION ONCOLOGY. Please see a copy of my visit note below.    Sophy Macdonald is here for scheduled HDR brachytherapy    HDR Single Channel Cylinder  4    Pre-procedure-  Patient identified, arm band applied, signed consent available   Medications taken by patient prior to procedure, none.  Pain assessment: 0/10  BP (!) 140/44   Pulse 82   SpO2 95%     Post-procedure-  Pain assessment: 0/10  Device removed without difficulty, Education for possible side effects and management. Discharge teaching reviewed, questions answered. Vaginal dilator (S+) use reviewed, Follow-up will be in [place] with Dr. Fournier. Discharged to [facility].         Again, thank you for allowing me to participate in the care of your patient.        Sincerely,        Vidal Hampton MD

## 2024-08-14 NOTE — PROGRESS NOTES
Sophy Macdonald is here for scheduled HDR brachytherapy    HDR Single Channel Cylinder  4    Pre-procedure-  Patient identified, arm band applied, signed consent available   Medications taken by patient prior to procedure, none.  Pain assessment: 0/10  BP (!) 140/44   Pulse 82   SpO2 95%     Post-procedure-  Pain assessment: 0/10  Device removed without difficulty, Education for possible side effects and management. Discharge teaching reviewed, questions answered. Vaginal dilator (S+) use reviewed, Follow-up will be in [place] with Dr. Fournier. Discharged to [facility].

## 2024-08-14 NOTE — PATIENT INSTRUCTIONS
Continuing Management of the Effects of Radiation Treatment    The side effects of radiation therapy should gradually decrease in 2 to 3 weeks after you have finished radiation.  Some effects take longer to resolve.    Skin reactions:  Skin changes (such as redness or irritation) should begin to get better gradually.  Some people will have a permanent change in skin color.  Their skin may be more pink or  tan  than the untreated skin.  The skin may be thinner or more fragile than before treatment.  Continue to use a gentle moisturizing lotion for several months.  You should always protect the skin in the area that was treated by using sunscreen of spf 30 or higher.      Other skin care instructions:    Fatigue caused by radiation therapy will decrease and your energy will improve.    For concerns or questions call Department of Therapeutic Radiology [phone]

## 2024-08-16 ENCOUNTER — OFFICE VISIT (OUTPATIENT)
Dept: RADIATION ONCOLOGY | Facility: CLINIC | Age: 79
End: 2024-08-16
Attending: RADIOLOGY
Payer: MEDICARE

## 2024-08-16 VITALS
OXYGEN SATURATION: 97 % | SYSTOLIC BLOOD PRESSURE: 124 MMHG | RESPIRATION RATE: 20 BRPM | HEART RATE: 77 BPM | DIASTOLIC BLOOD PRESSURE: 54 MMHG

## 2024-08-16 DIAGNOSIS — C80.1 CLEAR CELL CARCINOMA (H): Primary | ICD-10-CM

## 2024-08-16 PROCEDURE — 77336 RADIATION PHYSICS CONSULT: CPT | Performed by: RADIOLOGY

## 2024-08-16 PROCEDURE — 77770 HDR RDNCL NTRSTL/ICAV BRCHTX: CPT | Performed by: RADIOLOGY

## 2024-08-16 PROCEDURE — 77770 HDR RDNCL NTRSTL/ICAV BRCHTX: CPT | Mod: 26 | Performed by: RADIOLOGY

## 2024-08-16 PROCEDURE — 271N000005 HC IMPLANT RADIATION BRIEF: Performed by: RADIOLOGY

## 2024-08-16 PROCEDURE — C1717 BRACHYTX, NON-STR,HDR IR-192: HCPCS | Performed by: RADIOLOGY

## 2024-08-16 PROCEDURE — 57156 INS VAG BRACHYTX DEVICE: CPT | Performed by: RADIOLOGY

## 2024-08-16 PROCEDURE — 77280 THER RAD SIMULAJ FIELD SMPL: CPT | Performed by: RADIOLOGY

## 2024-08-16 PROCEDURE — 77280 THER RAD SIMULAJ FIELD SMPL: CPT | Mod: 26 | Performed by: RADIOLOGY

## 2024-08-16 NOTE — PROGRESS NOTES
Sophy Macdonald is here for scheduled HDR brachytherapy    HDR Single Channel Cylinder  5    Pre-procedure-    Patient identified, arm band applied, signed consent available   Medications taken by patient prior to procedure None mentioned to nurse on her own.  Pain assessment: No complaints of pain reported to nurse  /54 (Patient Position: Sitting, Cuff Size: Adult Regular)   Pulse 77   Resp 20   SpO2 97%     Post-procedure-  Pain assessment: Denies pain.    Device removed without difficulty, Education for possible side effects and management.    Aniya Hayes, RN  Radiation Oncology Nurse

## 2024-08-16 NOTE — LETTER
8/16/2024      Sophy Macdonald  [address]  [facility] [address]      Dear Colleague,    Thank you for referring your patient, Sophy Macdonald, to the [facility] RADIATION ONCOLOGY. Please see a copy of my visit note below.    Sophy Macdonald is here for scheduled HDR brachytherapy    HDR Single Channel Cylinder  5    Pre-procedure-    Patient identified, arm band applied, signed consent available   Medications taken by patient prior to procedure None mentioned to nurse on her own.  Pain assessment: No complaints of pain reported to nurse  /54 (Patient Position: Sitting, Cuff Size: Adult Regular)   Pulse 77   Resp 20   SpO2 97%     Post-procedure-  Pain assessment: Denies pain.    Device removed without difficulty, Education for possible side effects and management.    Aniya Hayes, RN  Radiation Oncology Nurse      Again, thank you for allowing me to participate in the care of your patient.        Sincerely,        Shelbie Garcia MD

## 2024-09-10 ENCOUNTER — LAB REQUISITION (OUTPATIENT)
Dept: LAB | Facility: CLINIC | Age: 79
End: 2024-09-10
Payer: MEDICARE

## 2024-09-10 DIAGNOSIS — L30.9 DERMATITIS, UNSPECIFIED: ICD-10-CM

## 2024-09-10 PROCEDURE — 88312 SPECIAL STAINS GROUP 1: CPT | Mod: TC,ORL | Performed by: STUDENT IN AN ORGANIZED HEALTH CARE EDUCATION/TRAINING PROGRAM

## 2024-09-10 PROCEDURE — 88305 TISSUE EXAM BY PATHOLOGIST: CPT | Mod: 26 | Performed by: DERMATOLOGY

## 2024-09-10 PROCEDURE — 88312 SPECIAL STAINS GROUP 1: CPT | Mod: 26 | Performed by: DERMATOLOGY

## 2024-09-14 ENCOUNTER — HEALTH MAINTENANCE LETTER (OUTPATIENT)
Age: 79
End: 2024-09-14

## 2024-09-23 LAB
PATH REPORT.COMMENTS IMP SPEC: NORMAL
PATH REPORT.FINAL DX SPEC: NORMAL
PATH REPORT.GROSS SPEC: NORMAL
PATH REPORT.MICROSCOPIC SPEC OTHER STN: NORMAL
PATH REPORT.RELEVANT HX SPEC: NORMAL

## 2024-10-28 ENCOUNTER — ONCOLOGY VISIT (OUTPATIENT)
Dept: RADIATION ONCOLOGY | Facility: CLINIC | Age: 79
End: 2024-10-28
Payer: COMMERCIAL

## 2024-10-28 NOTE — LETTER
10/28/2024      Sophy Macdonald  [address]  [facility] [address]      Dear Colleague,    Thank you for referring your patient, Sophy Macdonald, to the [facility] RADIATION ONCOLOGY. Please see a copy of my visit note below.    No notes on file    Again, thank you for allowing me to participate in the care of your patient.        Sincerely,        Vidal Hampton MD

## 2024-10-29 NOTE — PROCEDURES
Radiotherapy Treatment Summary          Date of Report: 2024     PATIENT: JOSE RAMON PENA  MEDICAL RECORD NO: [id number]  : 1945     DIAGNOSIS: C54.1 Malignant neoplasm of endometrium  INTENT OF RADIOTHERAPY: Cure  PATHOLOGY:      clear cell adenocarcinoma                             STAGE: pT1aN0(i+), FIGO IA (), FIGO IC ()  CONCURRENT SYSTEMIC THERAPY:                    Details of the treatments summarized below are found in records kept in the Department of Radiation Oncology at [facility].     Treatment Summary:  Radiation Oncology - Course: 1 Protocol:   Treatment Site  Current Dose  Modality  From  To  Elapsed Days  Fx.  1 Vaginal Cuff   3,000 cGy     2024    9   5                             Dose per Fraction: 600 cGy  Total Dose:      3000 cGy        COMMENTS:   Ms. Pena is a 78 yo female with an early stage endometrial cancer. This was incidentally found on   CT scan ordered for evaluation of recurrent UTI. On the imaging, her uterus was noted to be heterogeneous with several   masses and distended cavity. Hysteroscopy and D&C on  showed polypoid tissue and calcification in the left fundal   superior sidewall. 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 TP53 wildtype. She then   established care with Dr. Bravo and proceeded with TLH-BSO-SLN sampling on 2024. Pelvic washing was negative   for malignant cells. 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 However,   adenomyosis was present and involved by carcinoma. LVSI was not identified. A total of 2 SLN were removed, with ITC   identified in a right pelvic node. Final pathologic stage pT1aN0(I+), FIGO IA (), FIGO IC ().     She saw Dr. Francisco in heme/onc on 2024 and was recommended 3 cycles of Carbo/Taxol. She met with Dr. Marcos on 2024,who referred her to us for vaginal brachytherapy.      Ms. Pena  received 3000 cGy in 5 fractions given every other day to the vaginal cuff. She tolerated the treatment well   without any acute side effects.                        ED visits/hospitalizations: None     Missed treatments: None     Acute Toxicity Profile by CTC v5.0: None     PAIN MANAGEMENT:               Not required               FOLLOW UP PLAN:      She will follow up with her local physician in [place].                         Staff Physician: Vidal Hampton M.D.     CC   Cynthia Marcos MD                 Radiation Oncology:  [facility] [address] [place] [address]